# Patient Record
(demographics unavailable — no encounter records)

---

## 2024-10-09 NOTE — HISTORY OF PRESENT ILLNESS
[8] : 8 [0] : 0 [Dull/Aching] : dull/aching [] : yes [Constant] : constant [Household chores] : household chores [Leisure] : leisure [Sleep] : sleep [Retired] : Work status: retired [de-identified] : Patient is here with worsening bilateral shoulder pain. Left is worse than right. [FreeTextEntry1] : Shoulders  [FreeTextEntry7] : sometimes to his left elbow [de-identified] : jesseng his arm [de-identified] : Completed Physical therapy last month, was going 2 x a week

## 2024-10-09 NOTE — PHYSICAL EXAM
[Full Body Skin Exam Performed] : performed [FreeTextEntry3] : Skin examination performed of the face, neck, trunk, arms, legs;  The patient is well, alert and oriented, pleasant and cooperative. Eyelids, conjunctivae, oral mucosa, digits and nails all normal.   No cervical adenopathy.  Normal findings include:  Seborrheic keratoses- multiple larger SKs on back; legs Angiomas Lentigines ecchymoses on arms small healed scar nose bridge  No lesions were suspicious for malignancy.

## 2024-10-09 NOTE — PHYSICAL EXAM
[Right] : right shoulder [Standing] : standing [Trace] : trace [4 ___] : forward flexion 4[unfilled]/5 [Left] : left shoulder [Mild] : mild [5 ___] : forward flexion 5[unfilled]/5 [Sitting] : sitting [] : no sensory deficits [de-identified] : This is minimal. [TWNoteComboBox4] : passive forward flexion 105 degrees [TWNoteComboBox6] : internal rotation sacrum [de-identified] : external rotation 30 degrees

## 2024-10-09 NOTE — HISTORY OF PRESENT ILLNESS
[de-identified] : Pt. presents for skin check; c/o few spots of concern;   Severity:  mild   Modifying factors:  none Associated symptoms:  none Context:  no association with activity hx Basal Cell CA on nose in FLA  also:  hx severe PE, Factor V Leiden, on Eliquis permanently  also:  having issues with balance, falls-  needs walker occasionally

## 2024-10-09 NOTE — DATA REVIEWED
[FreeTextEntry1] : [facility] MRI RT SHOULDER IMPRESSIONS: 12/21/21\par  1. Calcific rotator cuff tendinitis with associated peritendinitis \par  2. Glenohumeral joint arthrosis, manifested by posterior labral tear and grade 3 chondrosis \par  3. Proximal biceps tendinopathy \par  4. Adhesive capsulitis \par  5. Glenohumeral joint effusion and synovitis \par  6. Bursitis \par  7. AC joint osteoarthritis\par  \par  [facility] MRI LT SHOULDER IMPRESSIONS: 12/21/21\par  1. Glenohumeral joint arthrosis, manifested by posterior labral tear and grade 3/4 chondrosis, and posterior glenohumeral joint subluxation\par  2. AC joint osteoarthritis \par  3. Rotator cuff tendinopathy and peritendinitis with low to moderate grade undersurface tearing\par  4. Mild bursitis

## 2024-10-09 NOTE — ASSESSMENT
[FreeTextEntry1] : We discussed the underlying pathology. Treatment options reviewed. Medication use discussed. An injection is planned for L shoulder.  PT is planned for L shoulder.  Cautions discussed. Questions answered. RTC 2-3 weeks for right shoulder SA injection   Patient seen by Dr. Alexi Nj, who determined the assessment and plan Lazara MCCAIN participated in the care of the patient, including the history and physical exam.   Procedure Name: Large Joint Injection / Aspiration: Depomedrol, Lidocaine and Guidance Ultrasound   Large Joint Injection was performed because of pain and inflammation. Depomedrol: An injection of Depomedrol 40 mg/cc, 2 cc. Lidocaine: An injection of Lidocaine 1 mg/cc, 13 cc.   Medication was injected in the left subacromial space and glenohumeral joint from a posterior approach. Patient has tried OTC's including aspirin, Ibuprofen, Aleve etc. or prescription NSAIDS, and/or exercises at home and/ or physical therapy without satisfactory response. The risks, benefits, and alternatives to steroid injection were explained in full to the patient. Risks outlined include but are not limited to infection, sepsis, bleeding, scarring, skin discoloration, temporary increase in pain, syncopal episode, failure to resolve symptoms, allergic reaction, symptom recurrence, and elevation of blood sugar in diabetics. Patient understood the risks. All questions were answered. After discussion, patient requested an injection. Oral informed consent was obtained.  Sterile preparation with betadine and aseptic technique was utilized for the procedure, including the preparation of the solutions used for the injection. Patient tolerated the procedure well. Post Procedure Instructions: Patient was advised to call if redness, pain, or fever occur and apply ice for 15 min. out of every hour for the next 12-24 hours as tolerated. Patient was advised to rest the joint(s) for 3 days.  Advised to ice the injection site this evening. Ultrasound Guidance was used for the following reasons: for precise injection in area of tear. Visualization of the needle and placement of injection was performed without complication.

## 2024-10-09 NOTE — REASON FOR VISIT
[FreeTextEntry2] : This is a 82 year old RHD male with bilateral shoulder pain for years without injury. Has seen Dr. Irby in the past and had Euflexxa series in July 2023 with good relief on the left shoulder and minimal relief on the right. Has had CSI in the past which only helped for a couple of weeks. Reaching is painful. Night symptoms can occur. There is no n/t. Tylenol use as needed with temporary relief. The right is worse today. He takes eliquis. The right SA/GH injection 5/8/24 helped "considerably".  Reports worsening pain since August 2024 without new injury. Has tried Tramadol and tylenol with minimal relief. Left is worse than right. Denies n/t. There are night symptoms.

## 2024-10-09 NOTE — IMAGING
[Right] : right shoulder [Left] : left shoulder [FreeTextEntry1] : 2V: Minimal GH changes. Type 2 Acromion.  [FreeTextEntry5] : 2V: Minimal GH Changes.

## 2024-10-09 NOTE — ASSESSMENT
[FreeTextEntry1] : Complete skin examination is negative for malignancy; Multiple new concerns were addressed and discussed. Therapeutic options and their risks and benefits; along with multiple diagnostic possibilities were discussed at length; risks and benefits of skin biopsy and/or other further study were discussed;   Continue regular exams; Follow up for TBSE in 1 year  Hx BCC- enrique in [facility]

## 2025-06-18 NOTE — ASSESSMENT
[FreeTextEntry1] : _____ We reviewed his issues.  MDP and PT are elected. A R SA/ca is indicated.  A Medrol dose pack is prescribed, dispense 1, to be taken as directed, with risks of GI symptoms reviewed. Physical Therapy is again prescribed for bilateral shoulders, 2x/week for 4-6 weeks. Follow up in 2 months  Patient seen by Dr. Alexi Nj, who determined the assessment and plan. Norma MCCAIN participated in the care of the patient, including the history and physical exam. IDenia, am scribing for Dr. Alexi Nj in his presence for the chief complaint, physical exam, studies, assessment, and/or plan. __ Procedure Name: Large Joint Injection: Depomedrol, Lidocaine and Guidance Ultrasound   Large Joint Injection was performed because of pain and inflammation. Depomedrol: An injection of Depomedrol 40 mg/cc, 2 cc. Lidocaine: An injection of Lidocaine 1 mg/cc, 13 cc.   Ultrasound Guidance was used for the following reasons: for precise injection in  {(ADD IN SPECIFIC)  }. Visualization of the needle and placement of injection was performed without complication. Imaging saved.   Medication was injected in the right subacromial space and glenohumeral joint from a posterior approach using a 22G needle. The risks, benefits, and alternatives to steroid injection were explained in full to the patient. Risks outlined include but are not limited to infection, sepsis, bleeding, scarring, skin discoloration, temporary increase in pain, syncopal episode, failure to resolve symptoms, allergic reaction, symptom recurrence, and elevation of blood sugar in diabetics. Patient understood the risks. All questions were answered. After discussion, patient requested an injection. Oral informed consent was obtained. Sterile preparation with betadine and aseptic technique was utilized for the procedure, including the preparation of the solutions used for the injection. Patient tolerated the procedure well. Post Procedure Instructions: Patient was advised to call if redness, pain, or fever occur and apply ice for 15 min. out of every hour for the next 12-24 hours as tolerated. Patient was advised to rest the joint for 3 days. Advised to ice the injection site this evening.

## 2025-06-18 NOTE — PHYSICAL EXAM
[Right] : right shoulder [Standing] : standing [Trace] : trace [4 ___] : forward flexion 4[unfilled]/5 [Left] : left shoulder [Sitting] : sitting [Mild] : mild [5 ___] : forward flexion 5[unfilled]/5 [] : no sensory deficits [de-identified] : This is minimal. [TWNoteComboBox4] : passive forward flexion 110 degrees [TWNoteComboBox6] : internal rotation sacrum [de-identified] : external rotation 45 degrees

## 2025-06-18 NOTE — REASON FOR VISIT
[FreeTextEntry2] : This is a 83 year old RHD male with bilateral shoulder pain for years without injury. Has seen Dr. Irby in the past and had Euflexxa series in July 2023 with good relief on the left shoulder and minimal relief on the right. Has had CSI in the past which only helped for a couple of weeks. Reaching is painful. Night symptoms can occur. There is no n/t. Tylenol use as needed with temporary relief. The right is worse today. He takes eliquis. The right SA/GH injection 5/8/24 helped "considerably".  Reports worsening pain since August 2024 without new injury. Has tried Tramadol and tylenol with minimal relief. Left is worse than right. Denies n/t. There are night symptoms.  On 6/18/25, he returns with continued discomfort in both shoulders.  The SA/GH injection to the left in October helped, slightly.  There is no new injury.

## 2025-06-18 NOTE — DATA REVIEWED
[FreeTextEntry1] : [facility] MRI RT SHOULDER IMPRESSIONS: 12/21/21\par  1. Calcific rotator cuff tendinitis with associated peritendinitis \par  2. Glenohumeral joint arthrosis, manifested by posterior labral tear and grade 3 chondrosis \par  3. Proximal biceps tendinopathy \par  4. Adhesive capsulitis \par  5. Glenohumeral joint effusion and synovitis \par  6. Bursitis \par  7. AC joint osteoarthritis\par  \par  [facility] MRI LT SHOULDER IMPRESSIONS: 12/21/21\par  1. Glenohumeral joint arthrosis, manifested by posterior labral tear and grade 3/4 chondrosis, and posterior glenohumeral joint subluxation\par  2. AC joint osteoarthritis \par  3. Rotator cuff tendinopathy and peritendinitis with low to moderate grade undersurface tearing\par  4. Mild bursitis

## 2025-06-26 NOTE — ASSESSMENT
[FreeTextEntry1] : US results reviewed. Recommend to follow up in one year, will plan for CT next year.

## 2025-06-26 NOTE — HISTORY OF PRESENT ILLNESS
[FreeTextEntry1] : Here for follow up visit. BPH/LUTS: currently on tamsulosin 0.4 mg, mirabegron 50 mg once daily. Tolerating well. Symptoms controlled. No hematuria, dysuria. No incontinence. No abdominal or flank pain.  H/o renal lesion, US in Sept 2021: 1.7 cm lesion, decreased in size from prior imaging. In January 2022, develop pulmonary embolus. CT abdomen showed 2.6 cm lesion the kidney, slight increase from prior imaging. CT January 2023: 2.4 cm right upper pole renal mass. CT May 2024: 2.5 x 2.6 cm right upper pole renal mass  Today in office ultrasound findings: Poorly visualized kidney but no large lesion noted. Bilateral renal cyst.

## 2025-07-14 NOTE — HISTORY OF PRESENT ILLNESS
[FreeTextEntry1] : 82 year old man with a history of BPH and hypertension for 15 year now with progressive arthritis of right knee S/P knee surgery.  Prior to the arthritis, he was active with no chest pain, dyspnea or palpitations,  For the past 5 months, his effort capacity has been reduced.  He denies orthopnea or PND.  He notes easy bruising and has seen hematology in the past with no issues identified by his report. He also has peripheral neuropathy most notable in his quadriceps with no clearly identified etiology.  He had a TKR in August 2022 which was uncomplicated.  While in [place] in January 2022, he had cardiovascular collapse and was diagnosed with saddle pulmonary emboli and bilateral DVTs.  Prior to the event, he had 9 days of exertional dyspnea.  He was treated with thrombolytics and chronic anticoagulation as well as an IVC filter.  Subsequent hematology evaluation revealed Factor 5 Leiden deficiency and is now on lifelong anticoagulation with Eliquis. He is doing well from the cardiac perspective and [facility] goal.

## 2025-07-14 NOTE — CARDIOLOGY SUMMARY
[de-identified] : 7/14/2025:  Sinus Rhythm with frequent PACs  Interventricular conduction delay  [de-identified] : 8/9/2021:  mild reversible inferior defect with diaphragmatic attenuation [de-identified] : 4/7/2022:  LVEF 60%, calcific aortic valve, mild pulmonary hypertension

## 2025-07-14 NOTE — REVIEW OF SYSTEMS
[Weight Loss (___ Lbs)] : [unfilled] ~Ulb weight loss [Easy Bruising] : a tendency for easy bruising [Negative] : Psychiatric [FreeTextEntry3] : retinal detachment in left eye [FreeTextEntry9] : right knee pain [de-identified] : easy bruising [de-identified] : peripheral neuropathy of quadriceps

## 2025-07-14 NOTE — PHYSICAL EXAM
[Well Developed] : well developed [Well Nourished] : well nourished [No Acute Distress] : no acute distress [Normal Conjunctiva] : normal conjunctiva [Normal Venous Pressure] : normal venous pressure [No Carotid Bruit] : no carotid bruit [Normal S1, S2] : normal S1, S2 [No Murmur] : no murmur [No Gallop] : no gallop [Clear Lung Fields] : clear lung fields [Good Air Entry] : good air entry [No Respiratory Distress] : no respiratory distress  [Soft] : abdomen soft [Non Tender] : non-tender [Normal Bowel Sounds] : normal bowel sounds [Normal Gait] : normal gait [No Cyanosis] : no cyanosis [Normal PT B/L] : normal PT B/L [Venous varicosities] : venous varicosities [No Rash] : no rash [No Skin Lesions] : no skin lesions [Moves all extremities] : moves all extremities [No Focal Deficits] : no focal deficits [Normal Speech] : normal speech [Alert and Oriented] : alert and oriented [Normal memory] : normal memory [de-identified] : 2+ edema left ankle

## 2025-07-23 NOTE — DATA REVIEWED
[FreeTextEntry1] : [facility] MRI RT SHOULDER IMPRESSIONS: 12/21/21\par  1. Calcific rotator cuff tendinitis with associated peritendinitis \par  2. Glenohumeral joint arthrosis, manifested by posterior labral tear and grade 3 chondrosis \par  3. Proximal biceps tendinopathy \par  4. Adhesive capsulitis \par  5. Glenohumeral joint effusion and synovitis \par  6. Bursitis \par  7. AC joint osteoarthritis\par  \par  [facility] MRI LT SHOULDER IMPRESSIONS: 12/21/21\par  1. Glenohumeral joint arthrosis, manifested by posterior labral tear and grade 3/4 chondrosis, and posterior glenohumeral joint subluxation\par  2. AC joint osteoarthritis \par  3. Rotator cuff tendinopathy and peritendinitis with low to moderate grade undersurface tearing\par  4. Mild bursitis

## 2025-07-23 NOTE — ASSESSMENT
[FreeTextEntry1] : _____ We reviewed his issues.  This is chronic with exacerbation. Naprosyn 375mg would be prescribed, but is contraindicated for medical reasons of eliquis. An injection and PT for the left elected. Physical Therapy is again prescribed for L shoulder, 2x/week for 4-6 weeks. Follow up in 2 months. Repeat x-rays with f/u. Questions answered. Caution discussed.  Patient seen by Dr. Alexi Nj, who determined the assessment and plan. Lizeth MCCAIN participated in the care of the patient, including the history and physical exam.  __ Procedure Name: Large Joint Injection: Depomedrol, Lidocaine and Guidance Ultrasound   Large Joint Injection was performed because of pain and inflammation. Depomedrol: An injection of Depomedrol 40 mg/cc, 2 cc. Lidocaine: An injection of Lidocaine 1 mg/cc, 13 cc.   Ultrasound Guidance was used for the following reasons: for precise injection in the area of arthritis and inflammation. Visualization of the needle and placement of injection was performed without complication. Imaging saved.   Medication was injected in the left subacromial space and glenohumeral joint from a posterior approach using a 22G needle. The risks, benefits, and alternatives to steroid injection were explained in full to the patient. Risks outlined include but are not limited to infection, sepsis, bleeding, scarring, skin discoloration, temporary increase in pain, syncopal episode, failure to resolve symptoms, allergic reaction, symptom recurrence, and elevation of blood sugar in diabetics. Patient understood the risks. All questions were answered. After discussion, patient requested an injection. Oral informed consent was obtained. Sterile preparation with betadine and aseptic technique was utilized for the procedure, including the preparation of the solutions used for the injection. Patient tolerated the procedure well. Post Procedure Instructions: Patient was advised to call if redness, pain, or fever occur and apply ice for 15 min. out of every hour for the next 12-24 hours as tolerated. Patient was advised to rest the joint for 3 days. Advised to ice the injection site this evening.

## 2025-07-23 NOTE — PHYSICAL EXAM
[Right] : right shoulder [Trace] : trace [Left] : left shoulder [Mild] : mild [Sitting] : sitting [5 ___] : forward flexion 5[unfilled]/5 [] : no sensory deficits [FreeTextEntry8] : These are less. [de-identified] : This is minimal. [TWNoteComboBox4] : passive forward flexion 110 degrees [TWNoteComboBox6] : internal rotation sacrum [de-identified] : external rotation 45 degrees

## 2025-07-23 NOTE — HISTORY OF PRESENT ILLNESS
[Retired] : Work status: retired [6] : 6 [2] : 2 [Sharp] : sharp [Intermittent] : intermittent [Leisure] : leisure [Meds] : meds [Physical therapy] : physical therapy [Lying in bed] : lying in bed [Injection therapy] : injection therapy [] : no [FreeTextEntry1] : BL Shoulders [FreeTextEntry9] : [facility] [de-identified] : raising left arm [de-identified] : PT 1-2x a week @ [facility] [place], [facility]

## 2025-07-23 NOTE — REASON FOR VISIT
[FreeTextEntry2] : This is a 83 year old RHD male with bilateral shoulder pain for years without injury. Has seen Dr. Irby in the past and had Euflexxa series in July 2023 with good relief on the left shoulder and minimal relief on the right. Has had CSI in the past which only helped for a couple of weeks. Reaching is painful. Night symptoms can occur. There is no n/t. Tylenol use as needed with temporary relief. The right is worse today. He takes eliquis. The right SA/GH injection 5/8/24 helped "considerably".  Reports worsening pain since August 2024 without new injury. Has tried Tramadol and tylenol with minimal relief. Left is worse than right. Denies n/t. There are night symptoms.  On 6/18/25, he returns with continued discomfort in both shoulders.  The SA/GH injection to the left in October helped, slightly.  There is no new injury. On 7/23/25, the R SA/GH injection, medrol, and PT have helped the right.  He feels 45% better in the right.  The left is no better.

## 2025-07-24 NOTE — PLAN
[TextEntry] : We discussed at length with the patient the options for treatment.  We discussed conservative care including physical therapy, acupuncture, massage therapy and chiropractic care.  We discussed injection therapy and even surgical intervention should the patient fail conservative care.  We discussed, risks, benefits, complications, alternatives, outcomes and expectations.   All questions answered.  Patient is being referred for physical therapy for various modalities.  If no improvement, consider MRI.  Recommend over the counter medications on as needed basis.  "Written by Kerrie Ambrosio, acting as Scribe for Nitesh Lim MD."   Dr. Lim - The documentation recorded by the scribe accurately reflects the service I personally performed, and the decisions made by me.

## 2025-07-24 NOTE — HISTORY OF PRESENT ILLNESS
[Dull/Aching] : dull/aching [de-identified] : 07/24/25: Return visit for this 83-year-old male, complaining of spontaneous onset of BL hip and groin pain from last 4 months. Pain worsens on sleeping on either side. Gets groin pain on sitting for prolonged time. No pain on walking. Has been seen two years before for similar problem. Wakes up from pain.   PMH: Spinal fusion x 2 times in past, right TKR  The patient is a 81 year old R hand dominant who presents today complaining of bilateral shoulder pain and right quad pain.  He c/o pain to both of his shoulders for many years. He was treated with physcial therapy last year with some relief. He was also treated with many cortisone injections over the years with the last one being this past November.  Date of Injury/Onset: Shoulder pain on and off for many years. Right quad pain for 10 days.  Pain:    At Rest: 7/10  With Activity:  6/10  Mechanism of injury: No known injury. This is not a Work Related Injury. This is not an athletic injury occurring associated with an interscholastic or organized sports team.  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

## 2025-07-24 NOTE — PHYSICAL EXAM
[de-identified] : The patient is a well appearing 81 year year old male of their stated age.\par  Neck is supple & nontender to palpation. Negative Spurling's test.\par  \par  General: in no acute distress, seated comfortably, moving easily\par  Skin: No discoloration, rashes; on palpation skin is dry, \par  Neuro: Normal sensation all dermatomes, motor all myotomes\par  Vascular: Normal pulses, no edema, normal temperature\par  Coordination and balance: Normal\par  Psych: normal mood and affect, non pressured speech, alert and oriented x3\par  \par  Right Shoulder \par  Inspection:\par  Scapula Winging: Negative\par  Deformity: None\par  Erythema: None\par  Ecchymosis: None\par  Abrasions: None\par  Effusion: None\par  \par  Range of Motion:\par  Active Forward Flexion: 160 degrees \par  Passive Forward Flexion: 170 degrees \par  Active IR : L4\par  Passive ER : 30 degrees\par  \par  Motor Exam:\par  Forward Flexion: 4+ out of 5\par  Flexion Plane of Scapula: 5 out of 5\par  Abduction: 4+ out of 5\par  Internal Rotation: 5 out of 5\par  External Rotation: 4+ out of 5\par  Distal Motor Strength: 5 out of 5\par  \par  Stability Testing:\par  Anterior: 1+\par  Posterior: 1+\par  Sulcus N: 1+\par  Sulcus ER: 1+\par  \par  Provocative Tests:\par  Drop Arm: Negative\par  Barber/Impingement: Positive\par  Humphreys: Positive\par  X-Arm Adduction: Negative\par  Belly Press: Negative\par  Bear Hug: Negative\par  Lift Off: Negative\par  Apprehension: Negative\par  Relocation: Negative\par  Posterior Load & Shift: Negative\par  \par  Palpation:\par  AC Joint: Nontender\par  Clavicle: Nontender\par  SC Joint: Nontender\par  Bicepital Groove: Positive\par  Coracoid Process: Nontender\par  Pectoralis Minor Tendon: Nontender\par  Pectoralis Major Tendon: Nontender & palpably intact\par  Latissimus Dorsi: Nontender \par  Proximal Humerus: Positive\par  Scapula Body: Nontender\par  Medial Scapula Boarder: Nontender\par  Scapula Spine: Nontender\par  \par  Neurologic Exam: Sensation to Light Touch:\par  Axillary: Grossly intact\par  Ulnar: Grossly intact\par  Radial: Grossly intact\par  Median: Grossly intact\par  Other:  N/A\par  \par  Circulatory/Pulses:\par  Ulnar: 2+\par  Radial: 2+\par  Other Pertinent Findings: None\par  \par  Left Shoulder\par  Range of Motion:\par  Active Forward Flexion: 180 degrees \par  Active Abduction: 180 degrees \par  Passive Forward Flexion: 180 degrees \par  Passive Abduction: 180 degrees \par  ER @ 90 degrees: 90 degrees\par  IR @ 90 degrees: 45 degrees\par  ER @ 0 degrees: 50 degrees\par  \par  Motor Exam:\par  Forward Flexion: 5 out of 5\par  Flexion Plane of Scapula: 5 out of 5\par  Abduction: 5 out of 5\par  Internal Rotation: 5 out of 5\par  External Rotation: 5 out of 5\par  Distal Motor Strength: 5 out of 5\par  \par  Stability Testing:\par  Anterior: 1+\par  Posterior: 1+\par  Sulcus N: 1+\par  Sulcus ER: 1+\par  \par  Other Pertinent Findings: None\par  \par   [Disc space narrowing] : Disc space narrowing [Implants in position] : Implants in position [Fusion intact] : Fusion intact [FreeTextEntry3] : long idline thoracolumbar incision [FreeTextEntry9] : Hips rotate well, no pain. [FreeTextEntry1] : L2-L3 pedicle screw fixation. Multilevel spinal fusion L2-S1. [de-identified] : extension 10 degrees [de-identified] : left lateral bending 20 degrees [Bilateral] : hip bilaterally [NL (30)] : extension 30 degrees [NL (35)] : adduction 35 degrees [NL (45)] : abduction 45 degrees [5___] : adduction 5[unfilled]/5 [] : light touch intact throughout [AP] : anteroposterior [Lateral] : lateral [There are no fractures, subluxations or dislocations. No significant abnormalities are seen] : There are no fractures, subluxations or dislocations. No significant abnormalities are seen [Mild arthritis (Tonnis Grade 1)] : Mild arthritis (Tonnis Grade 1) [TWNoteComboBox7] : flexion 100 degrees [de-identified] : external rotation 40 degrees [TWNoteComboBox6] : internal rotation 30 degrees

## 2025-07-24 NOTE — IMAGING
[Bilateral] : shoulder bilaterally [Degenerative change] : Degenerative change [Right] : right hip with pelvis [All Views] : anteroposterior, lateral [Moderate arthritis (Tonnis Grade 2)] : Moderate arthritis (Tonnis Grade 2)

## 2025-07-24 NOTE — DISCUSSION/SUMMARY
[de-identified] : Instructions: Dx / Natural History\par  The patient was advised of the diagnosis.  The natural history of the pathology was explained in full to the patient in layman's terms.  Several different treatment options were discussed and explained in full to the patient including the risks and benefits of both surgical and non-surgical treatments.  All questions and concerns were answered. \par  \par  RICE\par  I explained to the patient that rest, ice, compression, and elevation would benefit them.  They may return to activity after follow-up or when they no longer have any pain.\par  \par  NSAIDs - OTC\par  Patient is to begin over the counter oral anti-inflammatory medications on an as needed basis, as long as there are no medical contraindications.  Patient is counseled on possible GI and blood pressure side effects.\par  \par  Pain Guide Activities\par  The patient was advised to let pain guide the gradual advancement of activities.\par  \par  Icing\par  The patient was advised to apply ice (wrapped in a towel or protective covering) to the area daily (20 minutes at a time, 2-4X/day).\par  \par  All of the patient's questions were answered to His satisfaction. Diagnoses and potential treatments were reviewed. He agreed with the plan and would like to move forward with it.\par  \par  1. NSAIDS contraindicated due to eliquis, therefore recommend MDP. \par  2. CSI today b/l shoulders and tolerated well. \par  3. Discussed visco injections and even surgery in the future for reverse shoulder replacement if symptoms persist. \par